# Patient Record
Sex: MALE | Race: WHITE
[De-identification: names, ages, dates, MRNs, and addresses within clinical notes are randomized per-mention and may not be internally consistent; named-entity substitution may affect disease eponyms.]

---

## 2018-12-17 ENCOUNTER — APPOINTMENT (OUTPATIENT)
Dept: INTERNAL MEDICINE | Facility: CLINIC | Age: 63
End: 2018-12-17
Payer: COMMERCIAL

## 2018-12-17 VITALS
HEIGHT: 74 IN | BODY MASS INDEX: 26.05 KG/M2 | SYSTOLIC BLOOD PRESSURE: 118 MMHG | DIASTOLIC BLOOD PRESSURE: 74 MMHG | WEIGHT: 203 LBS

## 2018-12-17 PROCEDURE — 99214 OFFICE O/P EST MOD 30 MIN: CPT | Mod: 25

## 2018-12-17 PROCEDURE — 93000 ELECTROCARDIOGRAM COMPLETE: CPT

## 2018-12-17 PROCEDURE — 36415 COLL VENOUS BLD VENIPUNCTURE: CPT

## 2018-12-17 NOTE — HISTORY OF PRESENT ILLNESS
[FreeTextEntry8] : Patient is a 63-year-old male presenting for an annual exam. He is very active plays tennis very frequently. Gets regular exercise is maintaining his weight well. He is eating well sleeping well. No night sweats. No chills. No fever. He has a monoclonal gammopathy for which he is in a study in the city and is monitored on a q.6 month basis. Thus far is doing very well. His systems review is entirely negative

## 2018-12-18 LAB
ALBUMIN SERPL ELPH-MCNC: 4.4 G/DL
ALP BLD-CCNC: 53 U/L
ALT SERPL-CCNC: 30 U/L
ANION GAP SERPL CALC-SCNC: 11 MMOL/L
APPEARANCE: ABNORMAL
AST SERPL-CCNC: 41 U/L
BASOPHILS # BLD AUTO: 0.05 K/UL
BASOPHILS NFR BLD AUTO: 0.8 %
BILIRUB SERPL-MCNC: 0.4 MG/DL
BILIRUBIN URINE: NEGATIVE
BLOOD URINE: NEGATIVE
BUN SERPL-MCNC: 22 MG/DL
CALCIUM SERPL-MCNC: 9.7 MG/DL
CHLORIDE SERPL-SCNC: 106 MMOL/L
CHOLEST SERPL-MCNC: 187 MG/DL
CHOLEST/HDLC SERPL: 2.8 RATIO
CO2 SERPL-SCNC: 27 MMOL/L
COLOR: YELLOW
CREAT SERPL-MCNC: 1.11 MG/DL
EOSINOPHIL # BLD AUTO: 0.12 K/UL
EOSINOPHIL NFR BLD AUTO: 2 %
GLUCOSE QUALITATIVE U: NEGATIVE MG/DL
GLUCOSE SERPL-MCNC: 82 MG/DL
HCT VFR BLD CALC: 43.1 %
HDLC SERPL-MCNC: 68 MG/DL
HGB BLD-MCNC: 14.9 G/DL
IMM GRANULOCYTES NFR BLD AUTO: 0.2 %
KETONES URINE: ABNORMAL
LDLC SERPL CALC-MCNC: 101 MG/DL
LEUKOCYTE ESTERASE URINE: NEGATIVE
LYMPHOCYTES # BLD AUTO: 1.24 K/UL
LYMPHOCYTES NFR BLD AUTO: 20.5 %
MAN DIFF?: NORMAL
MCHC RBC-ENTMCNC: 31.9 PG
MCHC RBC-ENTMCNC: 34.6 GM/DL
MCV RBC AUTO: 92.3 FL
MONOCYTES # BLD AUTO: 0.49 K/UL
MONOCYTES NFR BLD AUTO: 8.1 %
NEUTROPHILS # BLD AUTO: 4.14 K/UL
NEUTROPHILS NFR BLD AUTO: 68.4 %
NITRITE URINE: NEGATIVE
PH URINE: 5.5
PLATELET # BLD AUTO: 160 K/UL
POTASSIUM SERPL-SCNC: 4.2 MMOL/L
PROT SERPL-MCNC: 7 G/DL
PROTEIN URINE: NEGATIVE MG/DL
PSA FREE FLD-MCNC: NORMAL
PSA FREE SERPL-MCNC: <0.01 NG/ML
PSA SERPL-MCNC: 0.01 NG/ML
RBC # BLD: 4.67 M/UL
RBC # FLD: 13.6 %
SODIUM SERPL-SCNC: 144 MMOL/L
SPECIFIC GRAVITY URINE: 1.03
T4 FREE SERPL-MCNC: 1.3 NG/DL
TRIGL SERPL-MCNC: 92 MG/DL
TSH SERPL-ACNC: 2.1 UIU/ML
UROBILINOGEN URINE: NEGATIVE MG/DL
WBC # FLD AUTO: 6.05 K/UL

## 2019-11-08 ENCOUNTER — RECORD ABSTRACTING (OUTPATIENT)
Age: 64
End: 2019-11-08

## 2019-11-08 DIAGNOSIS — Z83.3 FAMILY HISTORY OF DIABETES MELLITUS: ICD-10-CM

## 2019-11-08 DIAGNOSIS — Z83.438 FAMILY HISTORY OF OTHER DISORDER OF LIPOPROTEIN METABOLISM AND OTHER LIPIDEMIA: ICD-10-CM

## 2019-11-08 DIAGNOSIS — Z78.9 OTHER SPECIFIED HEALTH STATUS: ICD-10-CM

## 2019-11-08 DIAGNOSIS — Z80.0 FAMILY HISTORY OF MALIGNANT NEOPLASM OF DIGESTIVE ORGANS: ICD-10-CM

## 2020-01-27 ENCOUNTER — NON-APPOINTMENT (OUTPATIENT)
Age: 65
End: 2020-01-27

## 2020-01-27 ENCOUNTER — APPOINTMENT (OUTPATIENT)
Dept: INTERNAL MEDICINE | Facility: CLINIC | Age: 65
End: 2020-01-27
Payer: COMMERCIAL

## 2020-01-27 VITALS
BODY MASS INDEX: 25.93 KG/M2 | SYSTOLIC BLOOD PRESSURE: 108 MMHG | HEIGHT: 74 IN | DIASTOLIC BLOOD PRESSURE: 70 MMHG | WEIGHT: 202 LBS

## 2020-01-27 DIAGNOSIS — D07.5 CARCINOMA IN SITU OF PROSTATE: ICD-10-CM

## 2020-01-27 DIAGNOSIS — Z00.00 ENCOUNTER FOR GENERAL ADULT MEDICAL EXAMINATION W/OUT ABNORMAL FINDINGS: ICD-10-CM

## 2020-01-27 DIAGNOSIS — Z86.39 PERSONAL HISTORY OF OTHER ENDOCRINE, NUTRITIONAL AND METABOLIC DISEASE: ICD-10-CM

## 2020-01-27 PROCEDURE — 36415 COLL VENOUS BLD VENIPUNCTURE: CPT

## 2020-01-27 PROCEDURE — 99213 OFFICE O/P EST LOW 20 MIN: CPT | Mod: 25

## 2020-01-27 PROCEDURE — 93000 ELECTROCARDIOGRAM COMPLETE: CPT

## 2020-01-27 NOTE — HISTORY OF PRESENT ILLNESS
[de-identified] : Patient is a 65-year-old male status post carcinoma of the prostate basically healthy. He is without complaints without questions. His sense of well-being is good. He gets regular exercise. He is eating well sleeping well. No night sweats. No chills. No fever. His systems review is entirely negative. He has no questions. He's recently changed his urologist to Dr. Adrian, with whom he should have no difficulty.

## 2020-01-28 LAB
ALBUMIN SERPL ELPH-MCNC: 4.6 G/DL
ALP BLD-CCNC: 53 U/L
ALT SERPL-CCNC: 25 U/L
ANION GAP SERPL CALC-SCNC: 13 MMOL/L
APPEARANCE: ABNORMAL
AST SERPL-CCNC: 28 U/L
BASOPHILS # BLD AUTO: 0.06 K/UL
BASOPHILS NFR BLD AUTO: 1.5 %
BILIRUB SERPL-MCNC: 0.5 MG/DL
BILIRUBIN URINE: NEGATIVE
BLOOD URINE: NEGATIVE
BUN SERPL-MCNC: 20 MG/DL
CALCIUM SERPL-MCNC: 9.4 MG/DL
CHLORIDE SERPL-SCNC: 103 MMOL/L
CHOLEST SERPL-MCNC: 188 MG/DL
CHOLEST/HDLC SERPL: 2.9 RATIO
CO2 SERPL-SCNC: 24 MMOL/L
COLOR: YELLOW
CREAT SERPL-MCNC: 1 MG/DL
EOSINOPHIL # BLD AUTO: 0.19 K/UL
EOSINOPHIL NFR BLD AUTO: 4.8 %
GLUCOSE QUALITATIVE U: NEGATIVE
GLUCOSE SERPL-MCNC: 117 MG/DL
HCT VFR BLD CALC: 47.5 %
HDLC SERPL-MCNC: 66 MG/DL
HGB BLD-MCNC: 15.8 G/DL
IMM GRANULOCYTES NFR BLD AUTO: 0.3 %
KETONES URINE: NEGATIVE
LDLC SERPL CALC-MCNC: 109 MG/DL
LEUKOCYTE ESTERASE URINE: NEGATIVE
LYMPHOCYTES # BLD AUTO: 1.16 K/UL
LYMPHOCYTES NFR BLD AUTO: 29.4 %
MAN DIFF?: NORMAL
MCHC RBC-ENTMCNC: 31.7 PG
MCHC RBC-ENTMCNC: 33.3 GM/DL
MCV RBC AUTO: 95.2 FL
MONOCYTES # BLD AUTO: 0.42 K/UL
MONOCYTES NFR BLD AUTO: 10.7 %
NEUTROPHILS # BLD AUTO: 2.1 K/UL
NEUTROPHILS NFR BLD AUTO: 53.3 %
NITRITE URINE: NEGATIVE
PH URINE: 5
PLATELET # BLD AUTO: 174 K/UL
POTASSIUM SERPL-SCNC: 4.2 MMOL/L
PROT SERPL-MCNC: 7.2 G/DL
PROTEIN URINE: NEGATIVE
PSA FREE FLD-MCNC: NORMAL %
PSA FREE SERPL-MCNC: <0.01 NG/ML
PSA SERPL-MCNC: <0.01 NG/ML
RBC # BLD: 4.99 M/UL
RBC # FLD: 12.7 %
SODIUM SERPL-SCNC: 140 MMOL/L
SPECIFIC GRAVITY URINE: 1.03
T4 FREE SERPL-MCNC: 1.2 NG/DL
TRIGL SERPL-MCNC: 66 MG/DL
TSH SERPL-ACNC: 3.15 UIU/ML
UROBILINOGEN URINE: NORMAL
WBC # FLD AUTO: 3.94 K/UL

## 2020-05-06 ENCOUNTER — APPOINTMENT (OUTPATIENT)
Dept: INTERNAL MEDICINE | Facility: CLINIC | Age: 65
End: 2020-05-06
Payer: COMMERCIAL

## 2020-05-06 VITALS
HEIGHT: 74 IN | DIASTOLIC BLOOD PRESSURE: 74 MMHG | BODY MASS INDEX: 25.67 KG/M2 | WEIGHT: 200 LBS | SYSTOLIC BLOOD PRESSURE: 108 MMHG
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
## 2020-05-06 DIAGNOSIS — S80.862A INSECT BITE (NONVENOMOUS), LEFT LOWER LEG, INITIAL ENCOUNTER: ICD-10-CM

## 2020-05-06 PROCEDURE — 99213 OFFICE O/P EST LOW 20 MIN: CPT

## 2020-05-06 NOTE — HISTORY OF PRESENT ILLNESS
[de-identified] : Patient is a 65-year-old male presenting with a tick bite. The tick bite was on the mediolateral aspect of his upper thigh. On the left. He had a very small tick bite without significant erythema. There is approximately 5 days old. Prophylactic antibiotics were discussed and discouraged. He was encouraged, if he's concerned about continued Lyme disease. He presented approximately 2 weeks for a Western blot. Evaluation for the activity of present active disease. This was discussed at length

## 2020-10-05 ENCOUNTER — APPOINTMENT (OUTPATIENT)
Dept: GASTROENTEROLOGY | Facility: CLINIC | Age: 65
End: 2020-10-05

## 2021-02-08 ENCOUNTER — APPOINTMENT (OUTPATIENT)
Dept: FAMILY MEDICINE | Facility: CLINIC | Age: 66
End: 2021-02-08

## 2021-02-08 ENCOUNTER — APPOINTMENT (OUTPATIENT)
Dept: INTERNAL MEDICINE | Facility: CLINIC | Age: 66
End: 2021-02-08

## 2021-04-09 ENCOUNTER — APPOINTMENT (OUTPATIENT)
Dept: NEUROLOGY | Facility: CLINIC | Age: 66
End: 2021-04-09
Payer: COMMERCIAL

## 2021-04-09 VITALS
SYSTOLIC BLOOD PRESSURE: 141 MMHG | BODY MASS INDEX: 25.03 KG/M2 | DIASTOLIC BLOOD PRESSURE: 80 MMHG | WEIGHT: 195 LBS | HEART RATE: 62 BPM | HEIGHT: 74 IN | TEMPERATURE: 97.2 F

## 2021-04-09 DIAGNOSIS — F95.9 TIC DISORDER, UNSPECIFIED: ICD-10-CM

## 2021-04-09 PROCEDURE — 99203 OFFICE O/P NEW LOW 30 MIN: CPT

## 2021-04-09 PROCEDURE — 99072 ADDL SUPL MATRL&STAF TM PHE: CPT

## 2021-04-09 NOTE — REVIEW OF SYSTEMS
[Cough] : cough [Negative] : Heme/Lymph [FreeTextEntry7] : rectal bleeding,hemorrhoids  [de-identified] : hair loss

## 2021-04-09 NOTE — HISTORY OF PRESENT ILLNESS
[FreeTextEntry1] : This is a 65 yo man with over ten years of tremor in the hands.  It does not interfere with his function at all.  His handwriting has always been messy and it has not worsened. \par He has also had abnormal movements for the past few years which have worsened recently.  It is accompanied by a slight momentary urge, he can stop if he wants.  It can involve different muscles groups - face, neck, shoulder, moving the 5th digit against the 4th digit of both hands.  His wife noticed a much milder version of the facial movements over 25 years ago.   He has never taken any dopamine antagonist.  He has never had vocal tics. No tics as a child that he can remember.  His mother may have had abnormal movements in her face or head.

## 2021-04-09 NOTE — REASON FOR VISIT
[Consultation] : a consultation visit [FreeTextEntry1] : Involuntary movements,body tics started about year ago

## 2021-04-09 NOTE — ASSESSMENT
[FreeTextEntry1] : This is a 65 yo man with tics and essential tremor. \par \par The tremor does not interfere with his function in any way.\par \par The tics bother him - he would not like to take any medication for them at this time but would like to discuss his options and consider treatment in the future.  Second opinion with Dr. Joanna Day - Movement Disorders specialist.\par \par I discussed in detail with the patient and family the diagnosis, prognosis, treatment plan and answered all of their questions.\par \par

## 2021-04-09 NOTE — CONSULT LETTER
[Dear  ___] : Dear  [unfilled], [FreeTextEntry1] : I had the pleasure of evaluating your patient, YOLANDE DE LUNA. Please see the assessment section below for a summary of my diagnostic impression and plan.\par \par Thank you very much for allowing me to participate in the care of this patient. If you have any questions, please do not hesitate to contact me. \par \par Sincerely,\par \par Jessica Duval MD\par

## 2021-04-09 NOTE — PHYSICAL EXAM
[FreeTextEntry1] : Physical examination \par General: No acute distress, Awake, Alert.   \par   \par \par Mental status \par Awake, alert, and appropriate - gives detailed history.  \par \par Cranial Nerves \par II: VFF  \par III, IV, VI: PERRL, EOMI.   \par V: Facial sensation is normal B/L.   \par VII: Facial strength is normal B/L. \par VIII: Gross hearing is intact.   \par IX, X: Palate is midline and elevates symmetrically.   \par XI: Trapezius normal strength.   \par XII: Tongue midline without atrophy or fasciculations. \par \par Motor exam  \par Muscle tone - no evidence of rigidity or resistance in all 4 extremities.  \par No atrophy or fasciculations \par Muscle Strength: arms and legs, proximal and distal flexors and extensors are normal \par No UE drift.\par Very low amplitude moderate frequency tremor with action and posture. \par Tics involving the face, neck, shoulders, fingers, also in legs.\par \par \par \par Reflexes \par All present, normal, and symmetrical.   \par \par Plantars right: mute.   \par Plantars left: mute.   \par \par \par Coordination \par Finger to nose: Normal.  \par Heel to shin: Normal.   \par \par \par Sensory \par Intact sensation to vibration and cold.\par \par \par \par Gait \par Normal including heels, toes, and tandem gait.  \par \par \par

## 2021-07-21 ENCOUNTER — APPOINTMENT (OUTPATIENT)
Dept: NEUROLOGY | Facility: CLINIC | Age: 66
End: 2021-07-21
Payer: COMMERCIAL

## 2021-07-21 DIAGNOSIS — Z86.69 PERSONAL HISTORY OF OTHER DISEASES OF THE NERVOUS SYSTEM AND SENSE ORGANS: ICD-10-CM

## 2021-07-21 PROCEDURE — 99214 OFFICE O/P EST MOD 30 MIN: CPT

## 2021-07-21 NOTE — PHYSICAL EXAM
[Person] : oriented to person [Place] : oriented to place [Time] : oriented to time [Concentration Intact] : normal concentrating ability [Comprehension] : comprehension intact [Cranial Nerves Optic (II)] : visual acuity intact bilaterally,  visual fields full to confrontation, pupils equal round and reactive to light [Cranial Nerves Oculomotor (III)] : extraocular motion intact [Cranial Nerves Trigeminal (V)] : facial sensation intact symmetrically [Cranial Nerves Facial (VII)] : face symmetrical [Cranial Nerves Vestibulocochlear (VIII)] : hearing was intact bilaterally [Cranial Nerves Glossopharyngeal (IX)] : tongue and palate midline [Cranial Nerves Accessory (XI - Cranial And Spinal)] : head turning and shoulder shrug symmetric [Cranial Nerves Hypoglossal (XII)] : there was no tongue deviation with protrusion [Motor Strength] : muscle strength was normal in all four extremities [Paresis Pronator Drift Right-Sided] : no pronator drift on the right [Paresis Pronator Drift Left-Sided] : no pronator drift on the left [Sensation Tactile Decrease] : light touch was intact [Abnormal Walk] : normal gait [Balance] : balance was intact [Coordination - Dysmetria Impaired Finger-to-Nose Bilateral] : not present [1+] : Ankle jerk left 1+ [FreeTextEntry1] : There were mild physiologic tremors with hands outstretched. There was no significant bradykinesia or rigidity at this time.\par \par There were involuntary movements of the left shoulder and upper chest, with associated movement of the head, brief, that the patient was unaware of. There was occasional involuntary opening of the mouth. [FreeTextEntry8] : Mildly decreased right arm swing.

## 2021-07-21 NOTE — DISCUSSION/SUMMARY
[FreeTextEntry1] : Jah Pleitez is a 66 year old M with no significant PMHx who presents for initial evaluation in the Movement disorders clinic at [facility], [place]. He was referred by Dr. Duval for Tics. \par \par The patient's history and examination are concerning for possible motor tics, however, at his age, it is unusual to develop these movements. There were no parkinsonian findings. The patient is interested in treatment as he would like to movements to stop. We discussed options, and I recommend we start an alpha agonist as a first line treatment.\par \par Plan:\par - Will obtain MRI brain without contrast to evaluate for possible intracranial lesions that may be contributing, including ruling out stroke\par - Start Guanfacine 0.5mg BID\par \par RTC 3 months\par \par All questions were answered to the best of my knowledge.

## 2021-07-21 NOTE — HISTORY OF PRESENT ILLNESS
[FreeTextEntry1] : Jah Pleitez is a 66 year old M with no significant PMHx who presents for initial evaluation in the Movement disorders clinic at [facility]. He was referred by Dr. Duval for Tics. \par \par Per the patient, his wife first noticed his involuntary movements. He thinks they may have started 1-2 years ago, but is unsure if they occurred before. His movements include raising his eyebrows, opening his mouth/jaw to the point where he feels to sore areas around his mouth. He also had an abdominal tic, but this has dissipated. He has involuntary movements of his fingers, flicking the pinky against his 4th digit which in turn can cause reddening of the skin. This interrupts his work flow as he tends to go wash his hands at that time to stop the movements. To avoid this while driving, he places his hand between the console and seat. If he focuses on the movement, he can suppress it. He is unsure if he gets a particular urge to move and most of the time is unaware of certain movements aside from the fingers. \par \par He has noticed that when in social situations the movements get worse, particularly the facial movements.\par He is working currently. No changes in routine. He has no history of anxiety or depression.\par No family history of neurologic disease. \par

## 2021-10-14 ENCOUNTER — RX CHANGE (OUTPATIENT)
Age: 66
End: 2021-10-14

## 2021-11-03 ENCOUNTER — APPOINTMENT (OUTPATIENT)
Dept: NEUROLOGY | Facility: CLINIC | Age: 66
End: 2021-11-03
Payer: COMMERCIAL

## 2021-11-03 VITALS
DIASTOLIC BLOOD PRESSURE: 81 MMHG | TEMPERATURE: 97.3 F | HEART RATE: 60 BPM | SYSTOLIC BLOOD PRESSURE: 122 MMHG | HEIGHT: 74 IN | BODY MASS INDEX: 25.03 KG/M2 | WEIGHT: 195 LBS

## 2021-11-03 PROCEDURE — 99214 OFFICE O/P EST MOD 30 MIN: CPT

## 2021-11-04 NOTE — DISCUSSION/SUMMARY
[FreeTextEntry1] : Jah Pleitez is a 66 year old M with no significant PMHx who presents for follow up in the Movement disorders clinic at [facility]. He was initially referred by Dr. Duval for Tics. \par \par The patient's history and examination are concerning for possible motor tics, and he has had a response to Guanfacine, possibly supporting this diagnosis. \par \par Patient did not complete MRI brain. Will consider at next visit.\par \par Plan:\par - Increase Guanfacine to 1mg BID\par \par RTC 6 months\par \par All questions were answered to the best of my knowledge. I spent 30 minutes with this patient.

## 2021-11-04 NOTE — PHYSICAL EXAM
[Person] : oriented to person [Place] : oriented to place [Time] : oriented to time [Concentration Intact] : normal concentrating ability [Comprehension] : comprehension intact [Cranial Nerves Optic (II)] : visual acuity intact bilaterally,  visual fields full to confrontation, pupils equal round and reactive to light [Cranial Nerves Oculomotor (III)] : extraocular motion intact [Cranial Nerves Trigeminal (V)] : facial sensation intact symmetrically [Cranial Nerves Facial (VII)] : face symmetrical [Cranial Nerves Vestibulocochlear (VIII)] : hearing was intact bilaterally [Cranial Nerves Glossopharyngeal (IX)] : tongue and palate midline [Cranial Nerves Accessory (XI - Cranial And Spinal)] : head turning and shoulder shrug symmetric [Cranial Nerves Hypoglossal (XII)] : there was no tongue deviation with protrusion [Motor Strength] : muscle strength was normal in all four extremities [Sensation Tactile Decrease] : light touch was intact [Abnormal Walk] : normal gait [Balance] : balance was intact [1+] : Ankle jerk left 1+ [Paresis Pronator Drift Right-Sided] : no pronator drift on the right [Paresis Pronator Drift Left-Sided] : no pronator drift on the left [Coordination - Dysmetria Impaired Finger-to-Nose Bilateral] : not present [FreeTextEntry1] : There were mild physiologic tremors with hands outstretched. There was no significant bradykinesia or rigidity at this time.\par \par Initial examination: There were involuntary movements of the left shoulder and upper chest, with associated movement of the head, brief, that the patient was unaware of. There was occasional involuntary opening of the mouth.\par \par Today: Rare involuntary movements of the neck near the clavicle, improved from initial examination [FreeTextEntry8] : Mildly decreased right arm swing.

## 2021-11-04 NOTE — HISTORY OF PRESENT ILLNESS
[FreeTextEntry1] : Jah Pleitez is a 66 year old M with no significant PMHx who presents for follow up in the Movement disorders clinic at [facility]. He was initially referred by Dr. Duval for Tics. \par \par Interval history:\par SInce the last visit, the patient reports that he has seen a response to Guanfacine and he has been able to type better at work. His wife has also noticed that he does not have as many movements. He states that he still feels intermittent facial twitching near his cheek bones, but the frequency has decreased. He is interested in increasing the medication to see if further benefit can be obtained as he has not had any side effects.\par \par Initial history:\par Per the patient, his wife first noticed his involuntary movements. He thinks they may have started 1-2 years ago, but is unsure if they occurred before. His movements include raising his eyebrows, opening his mouth/jaw to the point where he feels to sore areas around his mouth. He also had an abdominal tic, but this has dissipated. He has involuntary movements of his fingers, flicking the pinky against his 4th digit which in turn can cause reddening of the skin. This interrupts his work flow as he tends to go wash his hands at that time to stop the movements. To avoid this while driving, he places his hand between the console and seat. If he focuses on the movement, he can suppress it. He is unsure if he gets a particular urge to move and most of the time is unaware of certain movements aside from the fingers. \par \par He has noticed that when in social situations the movements get worse, particularly the facial movements.\par He is working currently. No changes in routine. He has no history of anxiety or depression.\par No family history of neurologic disease. \par

## 2022-05-04 ENCOUNTER — APPOINTMENT (OUTPATIENT)
Dept: NEUROLOGY | Facility: CLINIC | Age: 67
End: 2022-05-04
Payer: COMMERCIAL

## 2022-05-04 ENCOUNTER — NON-APPOINTMENT (OUTPATIENT)
Age: 67
End: 2022-05-04

## 2022-05-04 VITALS
WEIGHT: 195 LBS | HEIGHT: 74 IN | BODY MASS INDEX: 25.03 KG/M2 | SYSTOLIC BLOOD PRESSURE: 130 MMHG | DIASTOLIC BLOOD PRESSURE: 80 MMHG | HEART RATE: 81 BPM

## 2022-05-04 PROCEDURE — 99214 OFFICE O/P EST MOD 30 MIN: CPT

## 2022-05-04 NOTE — HISTORY OF PRESENT ILLNESS
[FreeTextEntry1] : Jah Pleitez is a 67 year old M with no significant PMHx who presents for follow up in the Movement disorders clinic at [facility]. He was initially referred by Dr. Duval for Tics. \par \par Interval history:\par SInce the last visit, he reports that he is doing okay. His wife had a severe fall earlier this week that he was been trying to help her with as she needs surgery. He states that since July he would be driving and felt like he was going to lose control. An odd feeling would come over him, and it sort of progressed. It would happen infrequently. It peaked around the holidays, anxious is probably the closest description. Mentally he felt like he was disassociating. He was thinking it was related to guanfacine. He decreased his Guanfacine to 1 tablet daily. He has not been experiencing these feelings as much, but still there. \par He notes burning foot at night when he goes to lie down. Not bothersome during the day. Presumed diagnosis of plantar fasciitis, scheduled to see podiatry.\par \par Initial history:\par Per the patient, his wife first noticed his involuntary movements. He thinks they may have started 1-2 years ago, but is unsure if they occurred before. His movements include raising his eyebrows, opening his mouth/jaw to the point where he feels to sore areas around his mouth. He also had an abdominal tic, but this has dissipated. He has involuntary movements of his fingers, flicking the pinky against his 4th digit which in turn can cause reddening of the skin. This interrupts his work flow as he tends to go wash his hands at that time to stop the movements. To avoid this while driving, he places his hand between the console and seat. If he focuses on the movement, he can suppress it. He is unsure if he gets a particular urge to move and most of the time is unaware of certain movements aside from the fingers. \par \par He has noticed that when in social situations the movements get worse, particularly the facial movements.\par He is working currently. No changes in routine. He has no history of anxiety or depression.\par No family history of neurologic disease. \par

## 2022-05-04 NOTE — PHYSICAL EXAM
[Person] : oriented to person [Place] : oriented to place [Time] : oriented to time [Concentration Intact] : normal concentrating ability [Comprehension] : comprehension intact [Cranial Nerves Optic (II)] : visual acuity intact bilaterally,  visual fields full to confrontation, pupils equal round and reactive to light [Cranial Nerves Oculomotor (III)] : extraocular motion intact [Cranial Nerves Trigeminal (V)] : facial sensation intact symmetrically [Cranial Nerves Facial (VII)] : face symmetrical [Cranial Nerves Vestibulocochlear (VIII)] : hearing was intact bilaterally [Cranial Nerves Glossopharyngeal (IX)] : tongue and palate midline [Cranial Nerves Accessory (XI - Cranial And Spinal)] : head turning and shoulder shrug symmetric [Cranial Nerves Hypoglossal (XII)] : there was no tongue deviation with protrusion [Motor Strength] : muscle strength was normal in all four extremities [Sensation Tactile Decrease] : light touch was intact [Abnormal Walk] : normal gait [Balance] : balance was intact [1+] : Ankle jerk left 1+ [Paresis Pronator Drift Right-Sided] : no pronator drift on the right [Paresis Pronator Drift Left-Sided] : no pronator drift on the left [Coordination - Dysmetria Impaired Finger-to-Nose Bilateral] : not present [FreeTextEntry1] : There were mild physiologic tremors with hands outstretched. There was no significant bradykinesia or rigidity at this time.\par \par Initial examination: There were involuntary movements of the left shoulder and upper chest, with associated movement of the head, brief, that the patient was unaware of. There was occasional involuntary opening of the mouth.\par \par Today: Intermittent involuntary movements of the shoulders. [FreeTextEntry8] : Mildly decreased right arm swing.

## 2022-09-27 ENCOUNTER — RX RENEWAL (OUTPATIENT)
Age: 67
End: 2022-09-27

## 2023-01-04 ENCOUNTER — APPOINTMENT (OUTPATIENT)
Dept: NEUROLOGY | Facility: CLINIC | Age: 68
End: 2023-01-04
Payer: COMMERCIAL

## 2023-01-04 VITALS
WEIGHT: 202 LBS | HEART RATE: 60 BPM | DIASTOLIC BLOOD PRESSURE: 84 MMHG | SYSTOLIC BLOOD PRESSURE: 127 MMHG | OXYGEN SATURATION: 94 % | HEIGHT: 74 IN | BODY MASS INDEX: 25.93 KG/M2

## 2023-01-04 PROCEDURE — 99213 OFFICE O/P EST LOW 20 MIN: CPT

## 2023-01-04 RX ORDER — GUANFACINE 1 MG/1
1 TABLET ORAL
Qty: 540 | Refills: 3 | Status: DISCONTINUED | COMMUNITY
Start: 2021-07-21 | End: 2023-01-04

## 2023-01-04 NOTE — DISCUSSION/SUMMARY
[FreeTextEntry1] : Jah Pleitez is a 67 year old M with a PMHx of Neuropathy/burning feet who presents for follow up in the Movement disorders clinic at [facility]. He was initially referred by Dr. Duval for Tics. \par \par The patient's history and examination are concerning for possible motor tics, responding well to Guanfacine ER. \par \par Plan:\par - Continue Guanfacine ER 1mg daily \par \par RTC 6 months\par \par All questions were answered to the best of my knowledge. I spent 20 minutes with this patient.

## 2023-01-04 NOTE — HISTORY OF PRESENT ILLNESS
[FreeTextEntry1] : Jah Pleitez is a 67 year old M with a PMHx of Neuropathy/burning feet who presents for follow up in the Movement disorders clinic at [facility]. He was initially referred by Dr. Duval for Tics. \par \par Interval history:\par SInce the last visit, he has been feeling better on this dose of the medication. Facial movement is reduced. Hand movements don't happen anymore. He is not longer getting the "odd feeling". He denies any balance issues. His feet tend to burn when he is relaxed. It is not interrupting his sleep. \par \par Initial history:\par Per the patient, his wife first noticed his involuntary movements. He thinks they may have started 1-2 years ago, but is unsure if they occurred before. His movements include raising his eyebrows, opening his mouth/jaw to the point where he feels to sore areas around his mouth. He also had an abdominal tic, but this has dissipated. He has involuntary movements of his fingers, flicking the pinky against his 4th digit which in turn can cause reddening of the skin. This interrupts his work flow as he tends to go wash his hands at that time to stop the movements. To avoid this while driving, he places his hand between the console and seat. If he focuses on the movement, he can suppress it. He is unsure if he gets a particular urge to move and most of the time is unaware of certain movements aside from the fingers. \par \par He has noticed that when in social situations the movements get worse, particularly the facial movements.\par He is working currently. No changes in routine. He has no history of anxiety or depression.\par No family history of neurologic disease. \par

## 2023-01-04 NOTE — PHYSICAL EXAM
[Person] : oriented to person [Place] : oriented to place [Time] : oriented to time [Concentration Intact] : normal concentrating ability [Comprehension] : comprehension intact [Cranial Nerves Optic (II)] : visual acuity intact bilaterally,  visual fields full to confrontation, pupils equal round and reactive to light [Cranial Nerves Oculomotor (III)] : extraocular motion intact [Cranial Nerves Trigeminal (V)] : facial sensation intact symmetrically [Cranial Nerves Facial (VII)] : face symmetrical [Cranial Nerves Vestibulocochlear (VIII)] : hearing was intact bilaterally [Cranial Nerves Glossopharyngeal (IX)] : tongue and palate midline [Cranial Nerves Accessory (XI - Cranial And Spinal)] : head turning and shoulder shrug symmetric [Cranial Nerves Hypoglossal (XII)] : there was no tongue deviation with protrusion [Motor Strength] : muscle strength was normal in all four extremities [Sensation Tactile Decrease] : light touch was intact [Abnormal Walk] : normal gait [Balance] : balance was intact [1+] : Ankle jerk left 1+ [Romberg's Sign] : a positive Romberg's sign was present [Paresis Pronator Drift Right-Sided] : no pronator drift on the right [Paresis Pronator Drift Left-Sided] : no pronator drift on the left [Coordination - Dysmetria Impaired Finger-to-Nose Bilateral] : not present [FreeTextEntry1] : No parkinsonism.\par \par Initial examination: There were involuntary movements of the left shoulder and upper chest, with associated movement of the head, brief, that the patient was unaware of. There was occasional involuntary opening of the mouth.\par \par Today: Intermittent involuntary movements of the right shoulder with distraction [FreeTextEntry8] : Mildly decreased right arm swing.

## 2023-06-07 ENCOUNTER — APPOINTMENT (OUTPATIENT)
Dept: NEUROLOGY | Facility: CLINIC | Age: 68
End: 2023-06-07
Payer: COMMERCIAL

## 2023-06-07 VITALS
DIASTOLIC BLOOD PRESSURE: 80 MMHG | HEART RATE: 58 BPM | HEIGHT: 74 IN | SYSTOLIC BLOOD PRESSURE: 123 MMHG | BODY MASS INDEX: 25.67 KG/M2 | WEIGHT: 200 LBS

## 2023-06-07 PROCEDURE — 99213 OFFICE O/P EST LOW 20 MIN: CPT

## 2023-06-13 NOTE — PHYSICAL EXAM
[Person] : oriented to person [Place] : oriented to place [Time] : oriented to time [Concentration Intact] : normal concentrating ability [Comprehension] : comprehension intact [Cranial Nerves Optic (II)] : visual acuity intact bilaterally,  visual fields full to confrontation, pupils equal round and reactive to light [Cranial Nerves Oculomotor (III)] : extraocular motion intact [Cranial Nerves Trigeminal (V)] : facial sensation intact symmetrically [Cranial Nerves Facial (VII)] : face symmetrical [Cranial Nerves Vestibulocochlear (VIII)] : hearing was intact bilaterally [Cranial Nerves Glossopharyngeal (IX)] : tongue and palate midline [Cranial Nerves Accessory (XI - Cranial And Spinal)] : head turning and shoulder shrug symmetric [Cranial Nerves Hypoglossal (XII)] : there was no tongue deviation with protrusion [Motor Strength] : muscle strength was normal in all four extremities [Sensation Tactile Decrease] : light touch was intact [Romberg's Sign] : a positive Romberg's sign was present [Abnormal Walk] : normal gait [Balance] : balance was intact [1+] : Ankle jerk left 1+ [Paresis Pronator Drift Right-Sided] : no pronator drift on the right [Paresis Pronator Drift Left-Sided] : no pronator drift on the left [Coordination - Dysmetria Impaired Finger-to-Nose Bilateral] : not present [FreeTextEntry1] : No parkinsonism.\par \par Initial examination: There were involuntary movements of the left shoulder and upper chest, with associated movement of the head, brief, that the patient was unaware of. There was occasional involuntary opening of the mouth.\par \par Today: Intermittent involuntary movements of the right shoulder with distraction [FreeTextEntry8] : Mildly decreased right arm swing.

## 2023-06-13 NOTE — HISTORY OF PRESENT ILLNESS
[FreeTextEntry1] : Jah Pleitez is a 68 year old M with a PMHx of Neuropathy/burning feet who presents for follow up in the Movement disorders clinic at [facility] for Tics. He was initially referred by Dr. Duval for Tics. \par \par Interval history:\par SInce the last visit, he reports that his movements have been relatively under control but has noticed some more facial movements, described as a twitching. He continues to take Guanfacine 1mg ER daily.\par \par Initial history:\par Per the patient, his wife first noticed his involuntary movements. He thinks they may have started 1-2 years ago, but is unsure if they occurred before. His movements include raising his eyebrows, opening his mouth/jaw to the point where he feels to sore areas around his mouth. He also had an abdominal tic, but this has dissipated. He has involuntary movements of his fingers, flicking the pinky against his 4th digit which in turn can cause reddening of the skin. This interrupts his work flow as he tends to go wash his hands at that time to stop the movements. To avoid this while driving, he places his hand between the console and seat. If he focuses on the movement, he can suppress it. He is unsure if he gets a particular urge to move and most of the time is unaware of certain movements aside from the fingers. \par \par He has noticed that when in social situations the movements get worse, particularly the facial movements.\par He is working currently. No changes in routine. He has no history of anxiety or depression.\par No family history of neurologic disease. \par

## 2023-06-13 NOTE — DISCUSSION/SUMMARY
[FreeTextEntry1] : Jah Pleitez is a 67 year old M with a PMHx of Neuropathy/burning feet who presents for follow up in the Movement disorders clinic at [facility]. He was initially referred by Dr. Duval for Tics. \par \par The patient's history and examination are concerning for possible motor tics, responding well to Guanfacine ER. Will attempt to try and get better control of his tic movements.\par \par Plan:\par - Increase Guanfacine ER from 1mg to 2mg daily \par \par RTC 6 months\par \par All questions were answered to the best of my knowledge. I spent 20 minutes with this patient.

## 2023-10-06 ENCOUNTER — RX RENEWAL (OUTPATIENT)
Age: 68
End: 2023-10-06

## 2023-10-06 RX ORDER — GUANFACINE 2 MG/1
2 TABLET, EXTENDED RELEASE ORAL
Qty: 90 | Refills: 3 | Status: ACTIVE | COMMUNITY
Start: 2022-05-04 | End: 1900-01-01

## 2023-12-06 ENCOUNTER — APPOINTMENT (OUTPATIENT)
Dept: NEUROLOGY | Facility: CLINIC | Age: 68
End: 2023-12-06
Payer: COMMERCIAL

## 2023-12-06 VITALS
SYSTOLIC BLOOD PRESSURE: 138 MMHG | HEART RATE: 62 BPM | WEIGHT: 200 LBS | OXYGEN SATURATION: 95 % | DIASTOLIC BLOOD PRESSURE: 84 MMHG | BODY MASS INDEX: 25.67 KG/M2 | HEIGHT: 74 IN

## 2023-12-06 DIAGNOSIS — F95.8 OTHER TIC DISORDERS: ICD-10-CM

## 2023-12-06 PROCEDURE — 99213 OFFICE O/P EST LOW 20 MIN: CPT

## 2023-12-11 PROBLEM — F95.8 MOTOR TIC DISORDER: Status: ACTIVE | Noted: 2021-07-21

## 2025-07-22 DIAGNOSIS — M25.561 PAIN IN RIGHT KNEE: ICD-10-CM

## 2025-07-23 ENCOUNTER — APPOINTMENT (OUTPATIENT)
Dept: ORTHOPEDIC SURGERY | Facility: CLINIC | Age: 70
End: 2025-07-23
Payer: COMMERCIAL

## 2025-07-23 VITALS
OXYGEN SATURATION: 99 % | TEMPERATURE: 98.3 F | RESPIRATION RATE: 16 BRPM | HEIGHT: 74 IN | WEIGHT: 200 LBS | BODY MASS INDEX: 25.67 KG/M2 | DIASTOLIC BLOOD PRESSURE: 74 MMHG | SYSTOLIC BLOOD PRESSURE: 128 MMHG | HEART RATE: 70 BPM

## 2025-07-23 DIAGNOSIS — S83.241D OTHER TEAR OF MEDIAL MENISCUS, CURRENT INJURY, RIGHT KNEE, SUBSEQUENT ENCOUNTER: ICD-10-CM

## 2025-07-23 PROCEDURE — 99203 OFFICE O/P NEW LOW 30 MIN: CPT | Mod: 25

## 2025-07-23 PROCEDURE — 20611 DRAIN/INJ JOINT/BURSA W/US: CPT | Mod: RT
